# Patient Record
Sex: FEMALE | Race: BLACK OR AFRICAN AMERICAN
[De-identification: names, ages, dates, MRNs, and addresses within clinical notes are randomized per-mention and may not be internally consistent; named-entity substitution may affect disease eponyms.]

---

## 2021-07-04 ENCOUNTER — HOSPITAL ENCOUNTER (EMERGENCY)
Dept: HOSPITAL 61 - ER | Age: 26
Discharge: HOME | End: 2021-07-04
Payer: COMMERCIAL

## 2021-07-04 VITALS — WEIGHT: 293 LBS | HEIGHT: 64 IN | BODY MASS INDEX: 50.02 KG/M2

## 2021-07-04 VITALS — SYSTOLIC BLOOD PRESSURE: 158 MMHG | DIASTOLIC BLOOD PRESSURE: 86 MMHG

## 2021-07-04 DIAGNOSIS — S40.021A: Primary | ICD-10-CM

## 2021-07-04 DIAGNOSIS — M79.644: ICD-10-CM
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
## 2021-07-04 DIAGNOSIS — F17.200: ICD-10-CM
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
## 2021-07-04 DIAGNOSIS — M25.552: ICD-10-CM

## 2021-07-04 PROCEDURE — 99284 EMERGENCY DEPT VISIT MOD MDM: CPT

## 2021-07-04 PROCEDURE — 73502 X-RAY EXAM HIP UNI 2-3 VIEWS: CPT

## 2021-07-04 PROCEDURE — 73130 X-RAY EXAM OF HAND: CPT

## 2021-07-04 PROCEDURE — 81025 URINE PREGNANCY TEST: CPT
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
